# Patient Record
Sex: FEMALE | Race: WHITE | NOT HISPANIC OR LATINO | URBAN - METROPOLITAN AREA
[De-identification: names, ages, dates, MRNs, and addresses within clinical notes are randomized per-mention and may not be internally consistent; named-entity substitution may affect disease eponyms.]

---

## 2017-05-21 ENCOUNTER — HOSPITAL ENCOUNTER (OUTPATIENT)
Dept: RADIOLOGY | Facility: MEDICAL CENTER | Age: 37
End: 2017-05-21
Attending: EMERGENCY MEDICINE
Payer: COMMERCIAL

## 2017-05-21 ENCOUNTER — OFFICE VISIT (OUTPATIENT)
Dept: URGENT CARE | Facility: PHYSICIAN GROUP | Age: 37
End: 2017-05-21
Payer: COMMERCIAL

## 2017-05-21 VITALS
DIASTOLIC BLOOD PRESSURE: 82 MMHG | BODY MASS INDEX: 18.9 KG/M2 | HEIGHT: 71 IN | HEART RATE: 70 BPM | TEMPERATURE: 98.3 F | WEIGHT: 135 LBS | OXYGEN SATURATION: 100 % | SYSTOLIC BLOOD PRESSURE: 126 MMHG | RESPIRATION RATE: 14 BRPM

## 2017-05-21 DIAGNOSIS — M25.572 ACUTE LEFT ANKLE PAIN: ICD-10-CM

## 2017-05-21 DIAGNOSIS — S93.412A SPRAIN OF CALCANEOFIBULAR LIGAMENT OF LEFT ANKLE, INITIAL ENCOUNTER: ICD-10-CM

## 2017-05-21 DIAGNOSIS — S99.922A FOOT INJURY, LEFT, INITIAL ENCOUNTER: ICD-10-CM

## 2017-05-21 DIAGNOSIS — S93.602A SPRAIN OF FOOT, LEFT, INITIAL ENCOUNTER: ICD-10-CM

## 2017-05-21 PROCEDURE — E0114 CRUTCH UNDERARM PAIR NO WOOD: HCPCS | Mod: NU | Performed by: EMERGENCY MEDICINE

## 2017-05-21 PROCEDURE — 73610 X-RAY EXAM OF ANKLE: CPT | Mod: LT

## 2017-05-21 PROCEDURE — 73630 X-RAY EXAM OF FOOT: CPT | Mod: LT

## 2017-05-21 PROCEDURE — 99999 PR NO CHARGE: CPT | Performed by: EMERGENCY MEDICINE

## 2017-05-21 PROCEDURE — 99202 OFFICE O/P NEW SF 15 MIN: CPT | Performed by: EMERGENCY MEDICINE

## 2017-05-21 RX ORDER — HYDROCODONE BITARTRATE AND ACETAMINOPHEN 5; 325 MG/1; MG/1
1-2 TABLET ORAL EVERY 6 HOURS PRN
Qty: 12 TAB | Refills: 0 | Status: SHIPPED | OUTPATIENT
Start: 2017-05-21

## 2017-05-21 ASSESSMENT — ENCOUNTER SYMPTOMS
WEAKNESS: 0
FEVER: 0
FOCAL WEAKNESS: 0
SENSORY CHANGE: 0
TINGLING: 0
NUMBNESS: 0

## 2017-05-21 NOTE — MR AVS SNAPSHOT
"        Marissa Geovanni   2017 1:05 PM   Office Visit   MRN: [id number]    Department:  [facility]   Dept Phone:  [phone]    Description:  Female : 1980   Provider:  Rashel Swann M.D.           Reason for Visit     Foot Problem injury from a fall down stairs states it is painful and cannot bear weight       Allergies as of 2017     Not on File      You were diagnosed with     Sprain of foot, left, initial encounter   [236229]       Sprain of calcaneofibular ligament of left ankle, initial encounter   [465158]       Foot injury, left, initial encounter   [[id number]]       Acute left ankle pain   [8317573]         Vital Signs     Blood Pressure Pulse Temperature Respirations Height Weight    126/82 mmHg 70 36.8 °C (98.3 °F) 14 1.791 m (5' 10.5\") 61.236 kg (135 lb)    Body Mass Index Oxygen Saturation                19.09 kg/m2 100%          Basic Information     Date Of Birth Sex Race Ethnicity Preferred Language    1980 Female White Non- English      Health Maintenance     Patient has no pending health maintenance at this time      Current Immunizations     No immunizations on file.      Below and/or attached are the medications your provider expects you to take. Review all of your home medications and newly ordered medications with your provider and/or pharmacist. Follow medication instructions as directed by your provider and/or pharmacist. Please keep your medication list with you and share with your provider. Update the information when medications are discontinued, doses are changed, or new medications (including over-the-counter products) are added; and carry medication information at all times in the event of emergency situations     Allergies:  No Known Allergies          Medications  Valid as of: May 21, 2017 -  6:19 PM    Generic Name Brand Name Tablet Size Instructions for use    Hydrocodone-Acetaminophen (Tab) NORCO 5-325 MG Take 1-2 Tabs by mouth every 6 hours as " needed.        .                 Medicines prescribed today were sent to:     [id number] - [place], [place] - [address] [facility] [address]    [address] [place] [address]    Phone: [phone] Fax: [phone]    Open 24 Hours?: No      Medication refill instructions:       If your prescription bottle indicates you have medication refills left, it is not necessary to call your provider’s office. Please contact your pharmacy and they will refill your medication.    If your prescription bottle indicates you do not have any refills left, you may request refills at any time through one of the following ways: The online Mr. Number system (except Urgent Care), by calling your provider’s office, or by asking your pharmacy to contact your provider’s office with a refill request. Medication refills are processed only during regular business hours and may not be available until the next business day. Your provider may request additional information or to have a follow-up visit with you prior to refilling your medication.   *Please Note: Medication refills are assigned a new Rx number when refilled electronically. Your pharmacy may indicate that no refills were authorized even though a new prescription for the same medication is available at the pharmacy. Please request the medicine by name with the pharmacy before contacting your provider for a refill.        Your To Do List     Future Labs/Procedures Complete By Expires    DX-ANKLE 3+ VIEWS LEFT  As directed 5/21/2018    DX-FOOT-COMPLETE 3+ LEFT  As directed 5/21/2018      Referral     A referral request has been sent to our patient care coordination department. Please allow 3-5 business days for us to process this request and contact you either by phone or mail. If you do not hear from us by the 5th business day, please call us at [phone].        Instructions    Leave the splint in place, clean and dry, until follow-up. Referral  provided.      Sprain  A sprain happens when the bands of tissue that connect bones and hold joints together (ligaments) stretch too much or tear.  HOME CARE  · Raise (elevate) the injured area to lessen puffiness (swelling).  · Put ice on the injured area.  · Put ice in a plastic bag.  · Place a towel between your skin and the bag.  · Leave the ice on for 15-20 minutes, 3-4 times a day.  · Do this for the first 24 hours or as told by your doctor.  · Wear any splints, braces, castings, or elastic wraps as told by your child's doctor.  · Eat healthy foods.  · Only take medicine as told by your doctor.  GET HELP RIGHT AWAY IF:   · There is numbness or tingling in the injured limb.  · The toes or fingers become blue or white in the injured limb.  · The sprained limb is cold to the touch.  · There is a sharp, shooting pain in the injured limb.  · The puffiness is getting worse instead of better.  MAKE SURE YOU:   · Understand these instructions.  · Will watch this condition.  · Will get help right away if you are not doing well or get worse.  Document Released: 06/05/2009 Document Revised: 03/11/2013 Document Reviewed: 11/03/2010  ExitCare® Patient Information ©2014 [employer].            Youcruit Access Code: [email or web address]  Expires: 6/20/2017  6:19 PM    Your email address is not on file at [facility].  Email Addresses are required for you to sign up for Youcruit, please contact [phone] to verify your personal information and to provide your email address prior to attempting to register for Youcruit.    Marissa Galarza  [address] [address]    Youcruit  A secure, online tool to manage your health information     [facility]’s Youcruit® is a secure, online tool that connects you to your personalized health information from the privacy of your home -- day or night - making it very easy for you to manage your healthcare. Once the activation process is completed, you can even access your medical  information using the E[employer]y sylvie, which is available for free in the Apple Sylvie store or Google Play store.     To learn more about E[employer], visit [email or web address]    There are two levels of access available (as shown below):   My Chart Features  [facility]  Specialists [facility]  Urgent  Care Non-[facility] Primary [facility] Doctor   Email your healthcare team securely and privately 24/7 X X X    Manage appointments: schedule your next appointment; view details of past/upcoming appointments X      Request prescription refills. X      View recent personal medical records, including lab and immunizations X X X X   View health record, including health history, allergies, medications X X X X   Read reports about your outpatient visits, procedures, consult and ER notes X X X X   See your discharge summary, which is a recap of your hospital and/or ER visit that includes your diagnosis, lab results, and care plan X X  X     How to register for Etsy:  Once your e-mail address has been verified, follow the following steps to sign up for Etsy.     1. Go to  [email or web address]  2. Click on the Sign Up Now box, which takes you to the New Member Sign Up page. You will need to provide the following information:  a. Enter your Etsy Access Code exactly as it appears at the top of this page. (You will not need to use this code after you’ve completed the sign-up process. If you do not sign up before the expiration date, you must request a new code.)   b. Enter your date of birth.   c. Enter your home email address.   d. Click Submit, and follow the next screen’s instructions.  3. Create a Etsy ID. This will be your Etsy login ID and cannot be changed, so think of one that is secure and easy to remember.  4. Create a Etsy password. You can change your password at any time.  5. Enter your Password Reset Question and Answer. This can be used at a later time if you forget your password.    6. Enter your e-mail address. This allows you to receive e-mail notifications when new information is available in [employer].  7. Click Sign Up. You can now view your health information.    For assistance activating your [employer] account, call [phone]

## 2017-05-21 NOTE — PATIENT INSTRUCTIONS
Leave the splint in place, clean and dry, until follow-up. Referral provided.      Sprain  A sprain happens when the bands of tissue that connect bones and hold joints together (ligaments) stretch too much or tear.  HOME CARE  · Raise (elevate) the injured area to lessen puffiness (swelling).  · Put ice on the injured area.  · Put ice in a plastic bag.  · Place a towel between your skin and the bag.  · Leave the ice on for 15-20 minutes, 3-4 times a day.  · Do this for the first 24 hours or as told by your doctor.  · Wear any splints, braces, castings, or elastic wraps as told by your child's doctor.  · Eat healthy foods.  · Only take medicine as told by your doctor.  GET HELP RIGHT AWAY IF:   · There is numbness or tingling in the injured limb.  · The toes or fingers become blue or white in the injured limb.  · The sprained limb is cold to the touch.  · There is a sharp, shooting pain in the injured limb.  · The puffiness is getting worse instead of better.  MAKE SURE YOU:   · Understand these instructions.  · Will watch this condition.  · Will get help right away if you are not doing well or get worse.  Document Released: 06/05/2009 Document Revised: 03/11/2013 Document Reviewed: 11/03/2010  ExitCare® Patient Information ©2014 [employer].

## 2017-05-21 NOTE — PROGRESS NOTES
"Subjective:      Marissa Galarza is a 36 y.o. female who presents with Foot Problem            Foot Problem  This is a new problem. The current episode started yesterday. The problem occurs constantly. The problem has been unchanged. Pertinent negatives include no fever, numbness, rash or weakness. The symptoms are aggravated by walking. She has tried ice and rest for the symptoms. The treatment provided no relief.    states tripped by a dog while climbing down stairs at home, left foot twisted behind patient. Denies prior injury, denies significant associated injury.    Review of Systems   Constitutional: Negative for fever.   Musculoskeletal:        No pain proximal or distal to the sites.   Skin: Negative for rash.   Neurological: Negative for tingling, sensory change, focal weakness, weakness and numbness.     PMH:  has no past medical history on file.  MEDS:   Current outpatient prescriptions:   •  hydrocodone-acetaminophen (NORCO) 5-325 MG Tab per tablet, Take 1-2 Tabs by mouth every 6 hours as needed., Disp: 12 Tab, Rfl: 0  ALLERGIES: Not on File  SURGHX: No past surgical history on file.  SOCHX:    FH: family history is not on file.       Objective:     /82 mmHg  Pulse 70  Temp(Src) 36.8 °C (98.3 °F)  Resp 14  Ht 1.791 m (5' 10.5\")  Wt 61.236 kg (135 lb)  BMI 19.09 kg/m2  SpO2 100%     Physical Exam   Constitutional: Vital signs are normal. She appears well-developed and well-nourished. She is cooperative. She does not have a sickly appearance. She does not appear ill. No distress.   Cardiovascular:   Pulses:       Dorsalis pedis pulses are 2+ on the left side.   Musculoskeletal:        Right ankle: She exhibits swelling and ecchymosis. She exhibits normal range of motion and no deformity. Tenderness. Lateral malleolus, CF ligament and head of 5th metatarsal tenderness found. No medial malleolus and no proximal fibula tenderness found. Achilles tendon normal.        Left foot: There is decreased " range of motion and bony tenderness. There is no swelling, no crepitus and no deformity.        Feet:    Negative squeeze test.   Neurological: She is alert.   Distal sensation to light touch and pressure intact.   Skin: Skin is warm, dry and intact. No rash noted.   Psychiatric: She has a normal mood and affect.               Assessment/Plan:     1. Sprain of foot, left, initial encounter  Concern for Lisfranc injury  Orthopedic referral  2. Sprain of calcaneofibular ligament of left ankle, initial encounter  Posterior mold splint, crutches  3. Foot injury, left, initial encounter  - DX-FOOT-COMPLETE 3+ LEFT; per radiologist:  No evidence of fracture or dislocation.  4. Acute left ankle pain  - DX-ANKLE 3+ VIEWS LEFT; per radiologist:  Normal ankle series.  - hydrocodone-acetaminophen (NORCO) 5-325 MG Tab per tablet; Take 1-2 Tabs by mouth every 6 hours as needed.  Dispense: 12 Tab; Refill: 0